# Patient Record
Sex: FEMALE | Race: WHITE | URBAN - METROPOLITAN AREA
[De-identification: names, ages, dates, MRNs, and addresses within clinical notes are randomized per-mention and may not be internally consistent; named-entity substitution may affect disease eponyms.]

---

## 2017-04-18 PROCEDURE — 87624 HPV HI-RISK TYP POOLED RSLT: CPT | Performed by: OBSTETRICS & GYNECOLOGY

## 2017-04-18 PROCEDURE — 88175 CYTOPATH C/V AUTO FLUID REDO: CPT | Performed by: OBSTETRICS & GYNECOLOGY

## 2017-04-28 PROCEDURE — 88305 TISSUE EXAM BY PATHOLOGIST: CPT | Performed by: OBSTETRICS & GYNECOLOGY

## 2017-04-28 PROCEDURE — 88342 IMHCHEM/IMCYTCHM 1ST ANTB: CPT | Performed by: OBSTETRICS & GYNECOLOGY

## 2017-05-16 PROCEDURE — 88342 IMHCHEM/IMCYTCHM 1ST ANTB: CPT | Performed by: OBSTETRICS & GYNECOLOGY

## 2017-05-16 PROCEDURE — 88307 TISSUE EXAM BY PATHOLOGIST: CPT | Performed by: OBSTETRICS & GYNECOLOGY

## 2017-05-31 PROBLEM — N87.0 CIN I (CERVICAL INTRAEPITHELIAL NEOPLASIA I): Status: ACTIVE | Noted: 2017-05-31

## 2018-04-24 PROCEDURE — 87624 HPV HI-RISK TYP POOLED RSLT: CPT | Performed by: OBSTETRICS & GYNECOLOGY

## 2018-04-24 PROCEDURE — 88175 CYTOPATH C/V AUTO FLUID REDO: CPT | Performed by: OBSTETRICS & GYNECOLOGY

## 2018-08-16 PROCEDURE — 81003 URINALYSIS AUTO W/O SCOPE: CPT | Performed by: FAMILY MEDICINE

## 2018-11-02 PROBLEM — J40 BRONCHITIS: Status: ACTIVE | Noted: 2018-11-02

## 2018-11-02 PROBLEM — Z87.891 FORMER SMOKER: Status: ACTIVE | Noted: 2018-11-02

## 2018-11-02 PROBLEM — Z82.5 FAMILY HISTORY OF ASTHMA: Status: ACTIVE | Noted: 2018-11-02

## 2024-09-24 ENCOUNTER — HOSPITAL ENCOUNTER (OUTPATIENT)
Facility: HOSPITAL | Age: 49
Setting detail: HOSPITAL OUTPATIENT SURGERY
Discharge: HOME | End: 2024-09-24
Attending: OBSTETRICS & GYNECOLOGY | Admitting: OBSTETRICS & GYNECOLOGY
Payer: COMMERCIAL

## 2024-09-24 ENCOUNTER — ANESTHESIA EVENT (OUTPATIENT)
Dept: SURGERY | Facility: HOSPITAL | Age: 49
End: 2024-09-24
Payer: COMMERCIAL

## 2024-09-24 ENCOUNTER — ANESTHESIA (OUTPATIENT)
Dept: SURGERY | Facility: HOSPITAL | Age: 49
End: 2024-09-24
Payer: COMMERCIAL

## 2024-09-24 VITALS
RESPIRATION RATE: 20 BRPM | WEIGHT: 212 LBS | OXYGEN SATURATION: 100 % | BODY MASS INDEX: 33.67 KG/M2 | DIASTOLIC BLOOD PRESSURE: 95 MMHG | HEIGHT: 66.5 IN | HEART RATE: 65 BPM | TEMPERATURE: 97 F | SYSTOLIC BLOOD PRESSURE: 149 MMHG

## 2024-09-24 LAB — B-HCG UR QL: NEGATIVE

## 2024-09-24 PROCEDURE — 0UBC7ZX EXCISION OF CERVIX, VIA NATURAL OR ARTIFICIAL OPENING, DIAGNOSTIC: ICD-10-PCS | Performed by: OBSTETRICS & GYNECOLOGY

## 2024-09-24 PROCEDURE — 81025 URINE PREGNANCY TEST: CPT

## 2024-09-24 PROCEDURE — 88305 TISSUE EXAM BY PATHOLOGIST: CPT | Performed by: OBSTETRICS & GYNECOLOGY

## 2024-09-24 PROCEDURE — 88307 TISSUE EXAM BY PATHOLOGIST: CPT | Performed by: OBSTETRICS & GYNECOLOGY

## 2024-09-24 RX ORDER — SODIUM CHLORIDE, SODIUM LACTATE, POTASSIUM CHLORIDE, CALCIUM CHLORIDE 600; 310; 30; 20 MG/100ML; MG/100ML; MG/100ML; MG/100ML
INJECTION, SOLUTION INTRAVENOUS CONTINUOUS
Status: DISCONTINUED | OUTPATIENT
Start: 2024-09-24 | End: 2024-09-24

## 2024-09-24 RX ORDER — MIDAZOLAM HYDROCHLORIDE 1 MG/ML
1 INJECTION INTRAMUSCULAR; INTRAVENOUS EVERY 5 MIN PRN
Status: DISCONTINUED | OUTPATIENT
Start: 2024-09-24 | End: 2024-09-24

## 2024-09-24 RX ORDER — LIDOCAINE HYDROCHLORIDE 10 MG/ML
INJECTION, SOLUTION EPIDURAL; INFILTRATION; INTRACAUDAL; PERINEURAL AS NEEDED
Status: DISCONTINUED | OUTPATIENT
Start: 2024-09-24 | End: 2024-09-24 | Stop reason: SURG

## 2024-09-24 RX ORDER — HYDROMORPHONE HYDROCHLORIDE 1 MG/ML
0.4 INJECTION, SOLUTION INTRAMUSCULAR; INTRAVENOUS; SUBCUTANEOUS EVERY 5 MIN PRN
Status: DISCONTINUED | OUTPATIENT
Start: 2024-09-24 | End: 2024-09-24

## 2024-09-24 RX ORDER — DEXAMETHASONE SODIUM PHOSPHATE 4 MG/ML
VIAL (ML) INJECTION AS NEEDED
Status: DISCONTINUED | OUTPATIENT
Start: 2024-09-24 | End: 2024-09-24 | Stop reason: SURG

## 2024-09-24 RX ORDER — HYDROMORPHONE HYDROCHLORIDE 1 MG/ML
0.2 INJECTION, SOLUTION INTRAMUSCULAR; INTRAVENOUS; SUBCUTANEOUS EVERY 5 MIN PRN
Status: DISCONTINUED | OUTPATIENT
Start: 2024-09-24 | End: 2024-09-24

## 2024-09-24 RX ORDER — HYDROMORPHONE HYDROCHLORIDE 1 MG/ML
0.6 INJECTION, SOLUTION INTRAMUSCULAR; INTRAVENOUS; SUBCUTANEOUS EVERY 5 MIN PRN
Status: DISCONTINUED | OUTPATIENT
Start: 2024-09-24 | End: 2024-09-24

## 2024-09-24 RX ORDER — SCOLOPAMINE TRANSDERMAL SYSTEM 1 MG/1
1 PATCH, EXTENDED RELEASE TRANSDERMAL ONCE
Status: DISCONTINUED | OUTPATIENT
Start: 2024-09-24 | End: 2024-09-24 | Stop reason: HOSPADM

## 2024-09-24 RX ORDER — MIDAZOLAM HYDROCHLORIDE 1 MG/ML
INJECTION INTRAMUSCULAR; INTRAVENOUS AS NEEDED
Status: DISCONTINUED | OUTPATIENT
Start: 2024-09-24 | End: 2024-09-24 | Stop reason: SURG

## 2024-09-24 RX ORDER — DIPHENHYDRAMINE HYDROCHLORIDE 50 MG/ML
12.5 INJECTION INTRAMUSCULAR; INTRAVENOUS AS NEEDED
Status: DISCONTINUED | OUTPATIENT
Start: 2024-09-24 | End: 2024-09-24

## 2024-09-24 RX ORDER — PROCHLORPERAZINE EDISYLATE 5 MG/ML
5 INJECTION INTRAMUSCULAR; INTRAVENOUS EVERY 8 HOURS PRN
Status: DISCONTINUED | OUTPATIENT
Start: 2024-09-24 | End: 2024-09-24

## 2024-09-24 RX ORDER — ONDANSETRON 2 MG/ML
4 INJECTION INTRAMUSCULAR; INTRAVENOUS EVERY 6 HOURS PRN
Status: DISCONTINUED | OUTPATIENT
Start: 2024-09-24 | End: 2024-09-24

## 2024-09-24 RX ORDER — NALOXONE HYDROCHLORIDE 0.4 MG/ML
0.08 INJECTION, SOLUTION INTRAMUSCULAR; INTRAVENOUS; SUBCUTANEOUS AS NEEDED
Status: DISCONTINUED | OUTPATIENT
Start: 2024-09-24 | End: 2024-09-24

## 2024-09-24 RX ORDER — HYDROCODONE BITARTRATE AND ACETAMINOPHEN 5; 325 MG/1; MG/1
2 TABLET ORAL ONCE AS NEEDED
Status: DISCONTINUED | OUTPATIENT
Start: 2024-09-24 | End: 2024-09-24

## 2024-09-24 RX ORDER — KETOROLAC TROMETHAMINE 30 MG/ML
INJECTION, SOLUTION INTRAMUSCULAR; INTRAVENOUS AS NEEDED
Status: DISCONTINUED | OUTPATIENT
Start: 2024-09-24 | End: 2024-09-24 | Stop reason: SURG

## 2024-09-24 RX ORDER — HYDROCODONE BITARTRATE AND ACETAMINOPHEN 5; 325 MG/1; MG/1
1 TABLET ORAL ONCE AS NEEDED
Status: DISCONTINUED | OUTPATIENT
Start: 2024-09-24 | End: 2024-09-24

## 2024-09-24 RX ORDER — ACETAMINOPHEN 500 MG
1000 TABLET ORAL ONCE
Status: DISCONTINUED | OUTPATIENT
Start: 2024-09-24 | End: 2024-09-24 | Stop reason: HOSPADM

## 2024-09-24 RX ORDER — ACETAMINOPHEN 500 MG
1000 TABLET ORAL ONCE AS NEEDED
Status: DISCONTINUED | OUTPATIENT
Start: 2024-09-24 | End: 2024-09-24

## 2024-09-24 RX ORDER — ONDANSETRON 2 MG/ML
INJECTION INTRAMUSCULAR; INTRAVENOUS AS NEEDED
Status: DISCONTINUED | OUTPATIENT
Start: 2024-09-24 | End: 2024-09-24 | Stop reason: SURG

## 2024-09-24 RX ORDER — BUPIVACAINE HYDROCHLORIDE AND EPINEPHRINE 5; 5 MG/ML; UG/ML
INJECTION, SOLUTION EPIDURAL; INTRACAUDAL; PERINEURAL AS NEEDED
Status: DISCONTINUED | OUTPATIENT
Start: 2024-09-24 | End: 2024-09-24 | Stop reason: HOSPADM

## 2024-09-24 RX ORDER — MEPERIDINE HYDROCHLORIDE 25 MG/ML
12.5 INJECTION INTRAMUSCULAR; INTRAVENOUS; SUBCUTANEOUS AS NEEDED
Status: DISCONTINUED | OUTPATIENT
Start: 2024-09-24 | End: 2024-09-24

## 2024-09-24 RX ADMIN — LIDOCAINE HYDROCHLORIDE 5 ML: 10 INJECTION, SOLUTION EPIDURAL; INFILTRATION; INTRACAUDAL; PERINEURAL at 07:28:00

## 2024-09-24 RX ADMIN — DEXAMETHASONE SODIUM PHOSPHATE 4 MG: 4 MG/ML VIAL (ML) INJECTION at 07:30:00

## 2024-09-24 RX ADMIN — ONDANSETRON 4 MG: 2 INJECTION INTRAMUSCULAR; INTRAVENOUS at 07:40:00

## 2024-09-24 RX ADMIN — SODIUM CHLORIDE, SODIUM LACTATE, POTASSIUM CHLORIDE, CALCIUM CHLORIDE: 600; 310; 30; 20 INJECTION, SOLUTION INTRAVENOUS at 07:22:00

## 2024-09-24 RX ADMIN — SODIUM CHLORIDE, SODIUM LACTATE, POTASSIUM CHLORIDE, CALCIUM CHLORIDE: 600; 310; 30; 20 INJECTION, SOLUTION INTRAVENOUS at 07:47:00

## 2024-09-24 RX ADMIN — KETOROLAC TROMETHAMINE 30 MG: 30 INJECTION, SOLUTION INTRAMUSCULAR; INTRAVENOUS at 07:45:00

## 2024-09-24 RX ADMIN — MIDAZOLAM HYDROCHLORIDE 2 MG: 1 INJECTION INTRAMUSCULAR; INTRAVENOUS at 07:22:00

## 2024-09-24 NOTE — OPERATIVE REPORT
[facility]    PATIENT'S NAME: CB MANN   ATTENDING PHYSICIAN: Wanda Miguel M.D.   OPERATING PHYSICIAN: Wanda Miguel M.D.   PATIENT ACCOUNT#:   [id number]    LOCATION:  [facility] 5 EDW 10  MEDICAL RECORD #:   [id number]       YOB: 1975  ADMISSION DATE:       09/24/2024      OPERATION DATE:  09/24/2024    OPERATIVE REPORT    PREOPERATIVE DIAGNOSIS:  Cervical intraepithelial neoplasia 2.  POSTOPERATIVE DIAGNOSIS:  Cervical intraepithelial neoplasia 2.  PROCEDURE:  Loop electrosurgical excision procedure.    ASSISTANT:  None.    ANESTHESIA:  MAC.    FLUIDS:  Crystalloid.      URINE OUTPUT:  Not measured.    OPERATIVE TECHNIQUE:  The patient was taken to the operating room with an IV running.  She was administered anesthesia without difficulty.  Once anesthesia was adequate, she was placed in dorsal lithotomy position.  Speculum was placed in the vagina.  The cervix was bathed with 5% acetic acid solution.  Paracervical block with local anesthetic with epinephrine was administered with 10 mL of the anesthetic.  Once this was completed, using the white [place]Loop on 50 cut only, anterior and posterior specimens were obtained.  An endocervical specimen was obtained using the orange square.  The base of the LEEP was then made hemostatic with the ball cautery at 50 coag.  Excellent hemostasis was obtained.  The cervix was bathed with Monsel solution.  The patient was awakened from anesthesia and brought to the recovery room in excellent condition.     Dictated By Wanda Miguel M.D.  d: 09/24/2024 07:47:15  t: 09/24/2024 14:07:12  Job [id number]  AS/

## 2024-09-24 NOTE — ANESTHESIA POSTPROCEDURE EVALUATION
[facility] PHILIPP Souza Patient Status:  Hospital Outpatient Surgery   Age/Gender 49 year old female MRN [id number]   Location [facility] PERIOPERATIVE SERVICE Attending No att. providers found   Hosp Day # 0 PCP Clemencia Lilly MD       Anesthesia Post-op Note    LOOP ELECTROSURGICAL EXCISION PROCEDURE    Procedure Summary       Date: 09/24/24 Room / Location:  MAIN OR 04 / [facility] MAIN OR    Anesthesia Start: 0722 Anesthesia Stop: 0754    Procedure: LOOP ELECTROSURGICAL EXCISION PROCEDURE (Cervix) Diagnosis: (CIN2)    Surgeons: Wanda Miguel MD Anesthesiologist: Paco Dewitt MD    Anesthesia Type: MAC ASA Status: 2            Anesthesia Type: MAC    Vitals Value Taken Time   /95 09/24/24 0826   Temp  09/24/24 0934   Pulse 71 09/24/24 0826   Resp 20 09/24/24 0825   SpO2 100 % 09/24/24 0826   Vitals shown include unfiled device data.    Patient Location: Same Day Surgery    Anesthesia Type: MAC    Airway Patency: patent    Postop Pain Control: adequate    Mental Status: preanesthetic baseline    Nausea/Vomiting: none    Cardiopulmonary/Hydration status: stable euvolemic    Complications: no apparent anesthesia related complications    Postop vital signs: stable    Dental Exam: Unchanged from Preop    Patient to be discharged home when criteria met.

## 2024-09-24 NOTE — ANESTHESIA PREPROCEDURE EVALUATION
PRE-OP EVALUATION    Patient Name: Candie Souza    Admit Diagnosis: CIN2    Pre-op Diagnosis: CIN2    LOOP ELECTROSURGICAL EXCISION PROCEDURE    Anesthesia Procedure: LOOP ELECTROSURGICAL EXCISION PROCEDURE    Surgeons and Role:     * Wanda Miguel MD - Primary    Pre-op vitals reviewed.  Temp: 98.8 °F (37.1 °C)  Pulse: 69  Resp: 18  BP: 147/87  SpO2: 99 %  Body mass index is 33.71 kg/m².    Current medications reviewed.  Hospital Medications:  • acetaminophen (Tylenol Extra Strength) tab 1,000 mg  1,000 mg Oral Once   • scopolamine (Transderm-Scop) 1 MG/3DAYS patch 1 patch  1 patch Transdermal Once   • lactated ringers infusion   Intravenous Continuous       Outpatient Medications:     Medications Prior to Admission   Medication Sig Dispense Refill Last Dose   • Naproxen Sodium (ALEVE OR) Take by mouth daily as needed.   9/10/2024   • AMLODIPINE BESYLATE 10 MG Oral Tab TAKE 1 TABLET BY MOUTH EVERY DAY 30 tablet 11 9/23/2024 at 2100   • losartan Potassium 50 MG Oral Tab Take 1 tablet (50 mg total) by mouth daily. 90 tablet 3 9/23/2024 at 2100   • Esomeprazole Magnesium (NEXIUM) 20 MG Oral Capsule Delayed Release Take 1 capsule (20 mg total) by mouth every morning before breakfast.   9/24/2024 at 0300   • Ibuprofen (MOTRIN OR) prn for pain   9/10/2024   • cyclobenzaprine 10 MG Oral Tab One tablet daily while on a plane or long car ride, No etoh or driving (Patient not taking: Reported on 9/6/2024) 20 tablet 0 9/10/2024   • SUMAtriptan Succinate (IMITREX) 100 MG Oral Tab One tablet po at onset of migraine, may repeat after 2 hours, no more than 200 mg in 24 hour period 9 tablet 1 More than a month   • Ipratropium Bromide 0.06 % Nasal Solution 2 sprays by Nasal route 3 (three) times daily. (Patient not taking: Reported on 9/6/2024) 15 mL 6 Unknown       Allergies: Radiology contrast iodinated dyes, Latex, and Lisinopril      Anesthesia Evaluation    Patient summary reviewed.    Anesthetic Complications  (-)  history of anesthetic complications         GI/Hepatic/Renal    Negative GI/hepatic/renal ROS.                             Cardiovascular        Exercise tolerance: good     MET: >4    (+) obesity  (+) hypertension   (+) hyperlipidemia                                  Endo/Other    Negative endo/other ROS.                              Pulmonary    Negative pulmonary ROS.                       Neuro/Psych                   (+) headaches               Past Surgical History:   Procedure Laterality Date   •      • Colonoscopy     • Colonoscopy,biopsy  02    Dr. Yeager, hyperplastic polyp   • Exc arm/elbow les sc = 3 cm  2012    Procedure: EXCISION OF ARM MASS;  Surgeon: Sole Mitchell MD;  Location: [facility]   • Excis arm/elbow tumor,subcutan  2012    Procedure: EXCISION OF ARM MASS;  Surgeon: Sole Mitchell MD;  Location: [facility]   • Excis arm/elbow tumor,subcutan  2012    Procedure: EXCISION OF ARM MASS;  Surgeon: Sole Mitchell MD;  Location: [facility]   • Excis arm/elbow tumor,subcutan  2012    Procedure: EXCISION OF ARM MASS;  Surgeon: Sole Mitchell MD;  Location: [facility]   • Excis arm/elbow tumor,subcutan  2012    Procedure: EXCISION OF ARM MASS;  Surgeon: Sole Mitchell MD;  Location: [facility]   • Leep  2017   • Other surgical history      TRIGGER THUMB SURGERY BILATERAL   • Other surgical history  11/10    R parotid tumor/benign   • Upper gi endoscopy,biopsy  02    Dr. Yeager, mild gastritis, bx negative, hiatal hernia   • Upper gi endoscopy,diagnosis  04    Dr. Yeager, hiatal hernia, mild gastritis/esophagitis, gastric bx wnl     Social History     Socioeconomic History   • Marital status:    Tobacco Use   • Smoking status: Former     Current packs/day: 0.00     Average packs/day: 1 pack/day for 20.0 years (20.0 ttl pk-yrs)     Types: Cigarettes     Start date:  1988     Quit date: 2008     Years since quittin.7   • Smokeless tobacco: Never   • Tobacco comments:     max 1 pack a day, average 0.5 packs a day or less   Vaping Use   • Vaping status: Never Used   Substance and Sexual Activity   • Alcohol use: Yes     Alcohol/week: 0.0 standard drinks of alcohol     Comment: rare   • Drug use: No   • Sexual activity: Yes     Partners: Male     Birth control/protection: Rhythm   Other Topics Concern   • Seat Belt Yes     History   Drug Use No     Available pre-op labs reviewed.               Airway      Mallampati: II  Mouth opening: >3 FB  TM distance: > 6 cm   Cardiovascular    Cardiovascular exam normal.         Dental             Pulmonary    Pulmonary exam normal.                 Other findings        ASA: 2   Plan: MAC  NPO status verified and patient meets guidelines.          Plan/risks discussed with: patient            Present on Admission:  **None**

## 2024-09-24 NOTE — H&P
[facility]   part of [facility]    History & Physical    Candie Souza Patient Status:  Hospital Outpatient Surgery    1975 MRN [id number]   Location [facility] PERIOPERATIVE SERVICE Attending Wanda Miguel MD   Hosp Day # 0 PCP Clemencia Lilly MD     Date of Admission:  2024    SUBJECTIVE:    Reason for Admission:  FRANKLIN 2    History of Present Illness:  Patient is a(n) 49 year old female GP/LMP  with FRANKLIN 2 presents for LEEP    Medications:  Medications Prior to Admission   Medication Sig Dispense Refill Last Dose    Naproxen Sodium (ALEVE OR) Take by mouth daily as needed.   9/10/2024    AMLODIPINE BESYLATE 10 MG Oral Tab TAKE 1 TABLET BY MOUTH EVERY DAY 30 tablet 11 2024 at 2100    losartan Potassium 50 MG Oral Tab Take 1 tablet (50 mg total) by mouth daily. 90 tablet 3 2024 at 2100    Esomeprazole Magnesium (NEXIUM) 20 MG Oral Capsule Delayed Release Take 1 capsule (20 mg total) by mouth every morning before breakfast.   2024 at 0300    Ibuprofen (MOTRIN OR) prn for pain   9/10/2024    cyclobenzaprine 10 MG Oral Tab One tablet daily while on a plane or long car ride, No etoh or driving (Patient not taking: Reported on 2024) 20 tablet 0 9/10/2024    SUMAtriptan Succinate (IMITREX) 100 MG Oral Tab One tablet po at onset of migraine, may repeat after 2 hours, no more than 200 mg in 24 hour period 9 tablet 1 More than a month    Ipratropium Bromide 0.06 % Nasal Solution 2 sprays by Nasal route 3 (three) times daily. (Patient not taking: Reported on 2024) 15 mL 6 Unknown       Allergies:  Allergies   Allergen Reactions    Radiology Contrast Iodinated Dyes HIVES and NAUSEA AND VOMITING     Pt reports she is able to use topical iodine without allergic reaction    Latex RASH    Lisinopril Coughing        Past Medical History:  Past Medical History:    Anesthesia complication    ileus after epidural after     Atypical squamous cells of undetermined  significance (ASCUS) on Papanicolaou smear of cervix    Cervical high risk HPV (human papillomavirus) test positive    Cervical high risk HPV (human papillomavirus) test positive    FRANKLIN I (cervical intraepithelial neoplasia I)    FRANKLIN II (cervical intraepithelial neoplasia II)    GERD    HEADACHES    migraines    High blood pressure    History of COVID-19    cough, sore throat, sinus congestion. No continued symptoms    LGSIL (low grade squamous intraepithelial dysplasia)    Migraines    speech, vision, hearing problems with migraines    Nerve damage    right side of face. After parotid gland surgery    OTHER DISEASES    thyroid nodule    PKD (polycystic kidney disease)    PONV (postoperative nausea and vomiting)    Tumor of parotid gland    Visual impairment    glasses       Past Surgical History:  Past Surgical History:   Procedure Laterality Date          Colonoscopy      Colonoscopy,biopsy  02    Dr. Yeager, hyperplastic polyp    Exc arm/elbow les sc = 3 cm  2012    Procedure: EXCISION OF ARM MASS;  Surgeon: Sole Mitchell MD;  Location: [facility]    Excis arm/elbow tumor,subcutan  2012    Procedure: EXCISION OF ARM MASS;  Surgeon: Sole Mitchell MD;  Location: [facility]    Excis arm/elbow tumor,subcutan  2012    Procedure: EXCISION OF ARM MASS;  Surgeon: Sole Mitchell MD;  Location: [facility]    Excis arm/elbow tumor,subcutan  2012    Procedure: EXCISION OF ARM MASS;  Surgeon: Sole Mitchell MD;  Location: [facility]    Excis arm/elbow tumor,subcutan  2012    Procedure: EXCISION OF ARM MASS;  Surgeon: Sole Mitchell MD;  Location: [facility]    Lee  2017    Other surgical history      TRIGGER THUMB SURGERY BILATERAL    Other surgical history  11/10    R parotid tumor/benign    Upper gi endoscopy,biopsy  02    Dr. Yeager, mild gastritis, bx negative, hiatal hernia    Upper gi  endoscopy,diagnosis  04    Dr. Yeager, hiatal hernia, mild gastritis/esophagitis, gastric bx wnl       Past OB History:  OB History    Para Term  AB Living   1 1 1     1   SAB IAB Ectopic Multiple Live Births           1      # Outcome Date GA Lbr Steven/2nd Weight Sex Type Anes PTL Lv   1 Term  41w0d  6 lb 2 oz (2.778 kg) M Caesarean   CURT      Birth Comments: PCS       Past GYN History:   Recurrent FRANKLIN 2.  Irregular menses    Social History:  Social History     Tobacco Use    Smoking status: Former     Current packs/day: 0.00     Average packs/day: 1 pack/day for 20.0 years (20.0 ttl pk-yrs)     Types: Cigarettes     Start date: 1988     Quit date: 2008     Years since quittin.7    Smokeless tobacco: Never    Tobacco comments:     max 1 pack a day, average 0.5 packs a day or less   Substance Use Topics    Alcohol use: Yes     Alcohol/week: 0.0 standard drinks of alcohol     Comment: rare        Review of Systems:  normal menses, no abnormal bleeding, pelvic pain or discharge and no breast pain or new or enlarging lumps on self exam    OBJECTIVE:    Temp:  [98.8 °F (37.1 °C)] 98.8 °F (37.1 °C)  Pulse:  [69] 69  Resp:  [18] 18  BP: (147)/(87) 147/87  SpO2:  [99 %] 99 %  No intake or output data in the 24 hours ending 24 0705    Physical Exam:  GENERAL: well developed, well nourished, in no apparent distress  SKIN: no rashes, no suspicious lesions  HEENT: normal  NECK: supple; no thyroidmegaly, no adenopathy  BREAST: no masses, no nipple discharge, no skin retraction, no axillary adenopathy  LUNGS: clear to auscultation  CARDIOVASCULAR: normal S1, S2, RRR  ABDOMEN: Soft, non distended; non tender, no masses  GYNE/: External Genitalia: Normal                      Vagina: normal                      Uterus: anteverted, mobile, non tender, small                     Cervix: multip, no lesions                     Adnexa: non tender, no masses  EXTREMITIES:  non tender without  edema  NEUROLOGIC: intact  PSYCHIATRIC: alert and oriented x 3; affect appropriate      Diagnostics:  FRANKLIN 2 on colpo biopsies    Data Review:        ASSESSMENT:    Patient Active Problem List   Diagnosis    Obesity    HTN (hypertension)    Thyroid nodule    Dyslipidemia    Migraine headache    PKD (polycystic kidney disease)    FRANKLIN I (cervical intraepithelial neoplasia I)    Bronchitis    Former smoker    Family history of asthma            PLAN:    48 y/o with recurrent FRANKLIN 2 presesnts for repeat LEEP    All of the findings and plan were discussed with the patient.  She notes understanding and agrees with the plan of care.  All questions were answered to the best of my ability at this time.    Wanda Miguel MD  9/24/2024  7:05 AM

## 2024-09-24 NOTE — DISCHARGE INSTRUCTIONS
LEEP Procedure/[facility] Biopsy    Dr. Miguel    After surgery you may experience some light vaginal bleeding. This is not a concern unless it is heavier than a normal menstrual period.  You may also notice a discharge that is yellow tinged or looks dark. This is due to the medication the doctor put in the vagina to control bleeding.  After the first week or two you may notice a watery discharge that lasts up to 4-6 weeks.    You should avoid tampons or intercourse until after the doctor has examined you at your post op visit.     You may experience cramping the day/evening of surgery. This is usually relieved with over the counter Acetaminophen (Tylenol), Ibuprofen (Motrin or Advil) or Naprosyn (Aleve).    You should rest the day of surgery. No driving for 24 hours after general anesthesia or sedation or if you are taking prescription pain medications. You may resume normal activities the next day. Showering is fine the day after surgery. Exercise is fine the next day if you are comfortable doing it.    You may resume your normal diet once your appetite returns after surgery.  Some patients will experience nausea from anesthesia or narcotics: we recommend you start with a light diet. Do not drink alcohol or use other sedating medications (sleep aids, sedating cold medications) if taking prescription pain medications. Resume your normal medications as instructed.       Please call our office if you experience any of the following symptoms:  Temperature over 100.5 degrees  Vaginal bleeding heavier than a moderate period  Shortness of breath or chest pain  New onset of leg pain and/or swelling     Your doctor will call you with the pathology report within 10 days.  You should have a post op appointment with your doctor in 2 weeks.     Please call with any other questions or concerns.    Office Number: [phone]      You received a drug called Toradol which is an Anti Inflammatory at: 7:45am  If you are allowed to  take Anti inflammatories:    Do not take any Anti Inflammatory like Motrin, Aleve or Ibuprophen until after: 1:45pm.  Please report any suspected allergic reactions or bleeding issues to your doctor

## (undated) DEVICE — ELECTRODE ES 20X12MM SHFT 11CM WHT RND

## (undated) DEVICE — ELECTRODE ES 10X10MM SHFT 11CM MPLR LOOP YEL

## (undated) DEVICE — SOLUTION IRRIG 1000ML 0.9% NACL USP BTL

## (undated) DEVICE — PROCTO SWABS: Brand: DEROYAL

## (undated) DEVICE — GLOVE SUR 6.5 SENSICARE PI PIP CRM PWD F

## (undated) DEVICE — NEPTUNE E-SEP SMOKE EVACUATION PENCIL, COATED, 70MM BLADE, PUSH BUTTON SWITCH: Brand: NEPTUNE E-SEP

## (undated) DEVICE — TUBING MEGADYNE SPECULUM

## (undated) DEVICE — NEEDLE SPNL 22GA L3.5IN BLK QNCKE STYL DISP

## (undated) DEVICE — SLEEVE COMPR MD KNEE LEN SGL USE KENDALL SCD

## (undated) DEVICE — PACK GYNE CUSTOM

## (undated) DEVICE — PREMIUM WET SKIN PREP TRAY: Brand: MEDLINE INDUSTRIES, INC.

## (undated) DEVICE — Device

## (undated) NOTE — LETTER
OUTSIDE TESTING RESULT REQUEST     IMPORTANT: FOR YOUR IMMEDIATE ATTENTION  Please FAX all test results listed below to: [phone]     Testing already done on or about:      * * * * If testing is NOT complete, arrange with patient A.S.A.P. * * * *      Patient Name: Candie Souza  Surgery Date: 2024  Medical Record: [id number]  CSN: [id number]  : 1975 - A: 49 y     Sex: female  Surgeon(s):  Wanda Miguel MD  Procedure: LOOP ELECTROSURGICAL EXCISION PROCEDURE  Anesthesia Type: MAC     Surgeon: Wanda Miguel MD     The following Testing and Time Line are REQUIRED PER ANESTHESIA     EKG READ AND SIGNED WITHIN   90 days  BMP (requires 4 hour fast) within  90 days      Thank You,   Sent by: Kassandra SMITH